# Patient Record
Sex: FEMALE | Race: WHITE | NOT HISPANIC OR LATINO | Employment: OTHER | URBAN - METROPOLITAN AREA
[De-identification: names, ages, dates, MRNs, and addresses within clinical notes are randomized per-mention and may not be internally consistent; named-entity substitution may affect disease eponyms.]

---

## 2017-06-23 ENCOUNTER — CONSULT (OUTPATIENT)
Dept: CARDIOLOGY | Facility: CLINIC | Age: 72
End: 2017-06-23

## 2017-06-23 VITALS
HEART RATE: 59 BPM | DIASTOLIC BLOOD PRESSURE: 80 MMHG | WEIGHT: 168.8 LBS | SYSTOLIC BLOOD PRESSURE: 118 MMHG | BODY MASS INDEX: 28.82 KG/M2 | HEIGHT: 64 IN

## 2017-06-23 DIAGNOSIS — I10 ESSENTIAL HYPERTENSION: ICD-10-CM

## 2017-06-23 DIAGNOSIS — R06.02 SHORTNESS OF BREATH: ICD-10-CM

## 2017-06-23 DIAGNOSIS — R07.89 CHEST TIGHTNESS: Primary | ICD-10-CM

## 2017-06-23 DIAGNOSIS — E78.2 MIXED HYPERLIPIDEMIA: ICD-10-CM

## 2017-06-23 PROCEDURE — 99204 OFFICE O/P NEW MOD 45 MIN: CPT | Performed by: INTERNAL MEDICINE

## 2017-06-23 PROCEDURE — 93000 ELECTROCARDIOGRAM COMPLETE: CPT | Performed by: INTERNAL MEDICINE

## 2017-06-23 RX ORDER — LEVOTHYROXINE SODIUM 88 UG/1
88 TABLET ORAL DAILY
COMMUNITY
Start: 2017-04-20

## 2017-06-23 RX ORDER — ATORVASTATIN CALCIUM 20 MG/1
20 TABLET, FILM COATED ORAL DAILY
Status: ON HOLD | COMMUNITY
Start: 2017-04-20 | End: 2017-07-05

## 2017-06-23 RX ORDER — VALSARTAN 80 MG/1
80 TABLET ORAL DAILY
COMMUNITY
Start: 2017-04-20 | End: 2020-10-01 | Stop reason: ALTCHOICE

## 2017-06-23 RX ORDER — NAPROXEN 500 MG/1
500 TABLET ORAL DAILY PRN
COMMUNITY
End: 2020-10-02

## 2017-06-23 RX ORDER — ATENOLOL 50 MG/1
25 TABLET ORAL 2 TIMES DAILY
Status: ON HOLD | COMMUNITY
Start: 2017-04-20 | End: 2017-07-05

## 2017-06-23 RX ORDER — ASPIRIN 81 MG/1
162 TABLET ORAL DAILY
COMMUNITY

## 2017-06-23 NOTE — PROGRESS NOTES
Encounter Date:06/23/2017    Location: [place]    Jl Caal MD    Patient ID: Reshma Renee is a 72 y.o. female resident of [place], [place].    1945  Subjective:      Chief Complaint/Reason for visit:    Chief Complaint   Patient presents with   • Shortness of Breath   • Dizziness   • Chest Pain       Problem List:  1. Chest Pain/Tightness/Angina  2. Hypertension  3. Hyperlipidemia  4. Osteoarthritis  5. History of Tobacco Abuse with cessation 1989  6. Hypothyroid on Chronic Replacement Therapy  7. History of Migraine Headaches  8. CT angio of neck revealing 50-69 % proximal CRYSTAL stenosis  9. History of Colitis      HPI: Mrs. Renee is a 72 yr old white female with the above noted medical history who presents in consultation referred by Dr. Langston for further evaluation of her chest pain. Mrs. Renee states that on May 9th she developed chest tightness at rest with associated bilateral arm weakness, heart pounding and mild shortness of breath.  The symptoms lasted for 30 minutes. She had a similar episode last Wednesday that was less severe. She presented to the [place] ED and ruled out for Myocardial Infarction.  She states that she just doesn't feel well.  She denies chest tightness with exertional activities.  She does note dyspnea with exertion.  She has frequent almost daily episodes of being lightheaded. She denies edema, palpitations, orthopnea, PND or syncope.       Social History     Social History   • Marital status:      Spouse name: N/A   • Number of children: N/A   • Years of education: N/A     Occupational History   • Not on file.     Social History Main Topics   • Smoking status: Former Smoker     Quit date: 11/9/1989   • Smokeless tobacco: Not on file   • Alcohol use No   • Drug use: No   • Sexual activity: Defer     Other Topics Concern   • Not on file     Social History Narrative   • No narrative on file       family history includes Asthma in her mother; Heart failure  "in her mother; Parkinsonism in her sister; Suicidality in her father.     has a past medical history of Chest tightness (6/23/2017); Essential hypertension (6/23/2017); Hyperlipidemia; Hypertension; Mixed hyperlipidemia (6/23/2017); and Shortness of breath (6/23/2017).    No Known Allergies      Current Outpatient Prescriptions:   •  aspirin 81 MG EC tablet, Take 162 mg by mouth Daily., Disp: , Rfl:   •  atenolol (TENORMIN) 50 MG tablet, Take 50 mg by mouth Daily., Disp: , Rfl:   •  atorvastatin (LIPITOR) 20 MG tablet, Take 20 mg by mouth Daily., Disp: , Rfl:   •  levothyroxine (SYNTHROID, LEVOTHROID) 100 MCG tablet, Take 100 mcg by mouth Daily., Disp: , Rfl:   •  naproxen (NAPROSYN) 500 MG tablet, Take 500 mg by mouth 2 (Two) Times a Day With Meals., Disp: , Rfl:   •  valsartan (DIOVAN) 80 MG tablet, Take 80 mg by mouth Daily., Disp: , Rfl:     Review of Systems   Constitution: Positive for malaise/fatigue.   HENT: Negative.    Cardiovascular: Positive for chest pain and dyspnea on exertion. Negative for irregular heartbeat, leg swelling, near-syncope, orthopnea, palpitations, paroxysmal nocturnal dyspnea and syncope.   Respiratory: Positive for shortness of breath.    Endocrine: Negative.    Hematologic/Lymphatic: Negative.    Skin: Negative.    Musculoskeletal: Positive for arthritis, joint pain and muscle weakness.   Gastrointestinal: Positive for abdominal pain and heartburn.   Genitourinary: Negative.    Neurological: Positive for disturbances in coordination and light-headedness.   Psychiatric/Behavioral: Positive for memory loss.   Allergic/Immunologic: Negative.        Vitals:    06/23/17 1308   BP: 118/80   BP Location: Right arm   Patient Position: Sitting   Pulse: 59   Weight: 168 lb 12.8 oz (76.6 kg)   Height: 64\" (162.6 cm)         Objective:       Physical Exam   Constitutional: She is oriented to person, place, and time. She appears well-developed and well-nourished.   HENT:   Head: Normocephalic and " atraumatic.   Neck: Normal range of motion. No JVD present. No thyromegaly present.   Cardiovascular: Normal rate, regular rhythm, normal heart sounds and intact distal pulses.  Exam reveals no gallop and no friction rub.    No murmur heard.  Pulmonary/Chest: Effort normal and breath sounds normal. No respiratory distress. She has no wheezes. She has no rales.   Abdominal: Soft. Bowel sounds are normal. She exhibits no distension. There is no tenderness.   Musculoskeletal: Normal range of motion. She exhibits no edema.   Neurological: She is alert and oriented to person, place, and time.   Skin: Skin is warm and dry.   Psychiatric: She has a normal mood and affect. Her behavior is normal. Judgment and thought content normal.   Nursing note and vitals reviewed.      Data Review:     ECG 12 Lead  Date/Time: 6/23/2017 1:32 PM  Performed by: LYDIA DONIS  Authorized by: LYDIA DONIS   Comparison: compared with previous ECG from 5/10/2017  Similar to previous ECG  Rhythm: sinus bradycardia  Rate: bradycardic  BPM: 51  Conduction: conduction normal  ST Segments: ST segments normal  T Waves: T waves normal  QRS axis: normal  Other: no other findings  Clinical impression: normal ECG             Assessment:      Diagnosis Plan   1. Chest tightness  ECG 12 Lead    Case Request Cath Lab: Left Heart Cath   2. Shortness of breath  Case Request Cath Lab: Left Heart Cath   3. Essential hypertension  Well- controlled   4. Mixed hyperlipidemia  Check Lipid profile t time of cath     Plan:   1.  Cardiac catheterization next  Week  2.  Change Atenolol to 25 mg po bid  3.  Check TSH and Lipid profile at the time of cath.  4.  Further recommendations following cath.    Thank you for allowing us to participate in the care of your patient.     Scribed for Lydia Donis MD by Altagracia Fierro. 6/23/2017  1:57 PM    Lydia KWAN MD, personally performed the services described in this documentation as scribed by the above named  individual in my presence, and it is both accurate and complete.  6/23/2017  3:29 PM      Please note that portions of this note may have been completed with a voice recognition program. Efforts were made to edit the dictations, but occasionally words are mistranscribed.

## 2017-06-26 RX ORDER — NITROGLYCERIN 0.4 MG/1
TABLET SUBLINGUAL
Qty: 15 TABLET | Refills: 0 | Status: SHIPPED | OUTPATIENT
Start: 2017-06-26 | End: 2018-06-15 | Stop reason: SDUPTHER

## 2017-06-29 ENCOUNTER — PREP FOR SURGERY (OUTPATIENT)
Dept: OTHER | Facility: HOSPITAL | Age: 72
End: 2017-06-29

## 2017-06-29 DIAGNOSIS — R07.2 PRECORDIAL PAIN: Primary | ICD-10-CM

## 2017-06-29 RX ORDER — SODIUM CHLORIDE 0.9 % (FLUSH) 0.9 %
1-10 SYRINGE (ML) INJECTION AS NEEDED
Status: CANCELLED | OUTPATIENT
Start: 2017-06-29

## 2017-06-29 RX ORDER — NITROGLYCERIN 0.4 MG/1
0.4 TABLET SUBLINGUAL
Status: CANCELLED | OUTPATIENT
Start: 2017-06-29

## 2017-06-29 RX ORDER — ASPIRIN 81 MG/1
81 TABLET ORAL DAILY
Status: CANCELLED | OUTPATIENT
Start: 2017-06-30

## 2017-06-29 RX ORDER — ACETAMINOPHEN 325 MG/1
650 TABLET ORAL EVERY 4 HOURS PRN
Status: CANCELLED | OUTPATIENT
Start: 2017-06-29

## 2017-06-29 RX ORDER — ASPIRIN 81 MG/1
324 TABLET, CHEWABLE ORAL ONCE
Status: CANCELLED | OUTPATIENT
Start: 2017-06-29 | End: 2017-06-29

## 2017-07-05 ENCOUNTER — APPOINTMENT (OUTPATIENT)
Dept: GENERAL RADIOLOGY | Facility: HOSPITAL | Age: 72
End: 2017-07-05

## 2017-07-05 ENCOUNTER — HOSPITAL ENCOUNTER (OUTPATIENT)
Facility: HOSPITAL | Age: 72
Setting detail: HOSPITAL OUTPATIENT SURGERY
Discharge: HOME | End: 2017-07-05
Attending: INTERNAL MEDICINE | Admitting: INTERNAL MEDICINE

## 2017-07-05 VITALS
RESPIRATION RATE: 16 BRPM | WEIGHT: 167.55 LBS | BODY MASS INDEX: 28.6 KG/M2 | TEMPERATURE: 97.5 F | SYSTOLIC BLOOD PRESSURE: 124 MMHG | OXYGEN SATURATION: 99 % | HEIGHT: 64 IN | HEART RATE: 47 BPM | DIASTOLIC BLOOD PRESSURE: 90 MMHG

## 2017-07-05 DIAGNOSIS — I77.9 RIGHT-SIDED CAROTID ARTERY DISEASE (HCC): ICD-10-CM

## 2017-07-05 DIAGNOSIS — R07.89 CHEST TIGHTNESS: ICD-10-CM

## 2017-07-05 DIAGNOSIS — E78.5 HYPERLIPIDEMIA LDL GOAL <70: ICD-10-CM

## 2017-07-05 DIAGNOSIS — I10 ESSENTIAL HYPERTENSION: ICD-10-CM

## 2017-07-05 DIAGNOSIS — R07.2 PRECORDIAL PAIN: ICD-10-CM

## 2017-07-05 DIAGNOSIS — R06.02 SHORTNESS OF BREATH: ICD-10-CM

## 2017-07-05 DIAGNOSIS — I25.119 CORONARY ARTERY DISEASE INVOLVING NATIVE CORONARY ARTERY OF NATIVE HEART WITH ANGINA PECTORIS (HCC): Primary | ICD-10-CM

## 2017-07-05 PROBLEM — R00.1 DRUG-INDUCED BRADYCARDIA: Status: ACTIVE | Noted: 2017-07-05

## 2017-07-05 PROBLEM — I20.0 UNSTABLE ANGINA (HCC): Status: ACTIVE | Noted: 2017-06-23

## 2017-07-05 PROBLEM — T50.905A DRUG-INDUCED BRADYCARDIA: Status: ACTIVE | Noted: 2017-07-05

## 2017-07-05 PROBLEM — E03.9 HYPOTHYROIDISM: Status: ACTIVE | Noted: 2017-07-05

## 2017-07-05 PROBLEM — R00.2 PALPITATIONS: Status: ACTIVE | Noted: 2017-07-05

## 2017-07-05 LAB
ALBUMIN SERPL-MCNC: 4 G/DL (ref 3.2–4.8)
ALBUMIN/GLOB SERPL: 1.5 G/DL (ref 1.5–2.5)
ALP SERPL-CCNC: 68 U/L (ref 25–100)
ALT SERPL W P-5'-P-CCNC: 17 U/L (ref 7–40)
ANION GAP SERPL CALCULATED.3IONS-SCNC: 8 MMOL/L (ref 3–11)
ARTICHOKE IGE QN: 81 MG/DL (ref 0–130)
AST SERPL-CCNC: 23 U/L (ref 0–33)
BASOPHILS # BLD AUTO: 0.04 10*3/MM3 (ref 0–0.2)
BASOPHILS NFR BLD AUTO: 0.8 % (ref 0–1)
BILIRUB SERPL-MCNC: 0.6 MG/DL (ref 0.3–1.2)
BUN BLD-MCNC: 13 MG/DL (ref 9–23)
BUN/CREAT SERPL: 11.8 (ref 7–25)
CALCIUM SPEC-SCNC: 10.4 MG/DL (ref 8.7–10.4)
CHLORIDE SERPL-SCNC: 107 MMOL/L (ref 99–109)
CHOLEST SERPL-MCNC: 137 MG/DL (ref 0–200)
CO2 SERPL-SCNC: 26 MMOL/L (ref 20–31)
CREAT BLD-MCNC: 1.1 MG/DL (ref 0.6–1.3)
DEPRECATED RDW RBC AUTO: 43.5 FL (ref 37–54)
EOSINOPHIL # BLD AUTO: 0.13 10*3/MM3 (ref 0–0.3)
EOSINOPHIL NFR BLD AUTO: 2.5 % (ref 0–3)
ERYTHROCYTE [DISTWIDTH] IN BLOOD BY AUTOMATED COUNT: 12.6 % (ref 11.3–14.5)
GFR SERPL CREATININE-BSD FRML MDRD: 49 ML/MIN/1.73
GLOBULIN UR ELPH-MCNC: 2.7 GM/DL
GLUCOSE BLD-MCNC: 124 MG/DL (ref 70–100)
HBA1C MFR BLD: 6.5 % (ref 4.8–5.6)
HCT VFR BLD AUTO: 38.1 % (ref 34.5–44)
HDLC SERPL-MCNC: 40 MG/DL (ref 40–60)
HGB BLD-MCNC: 12.3 G/DL (ref 11.5–15.5)
IMM GRANULOCYTES # BLD: 0 10*3/MM3 (ref 0–0.03)
IMM GRANULOCYTES NFR BLD: 0 % (ref 0–0.6)
LYMPHOCYTES # BLD AUTO: 1.88 10*3/MM3 (ref 0.6–4.8)
LYMPHOCYTES NFR BLD AUTO: 36.4 % (ref 24–44)
MCH RBC QN AUTO: 30.6 PG (ref 27–31)
MCHC RBC AUTO-ENTMCNC: 32.3 G/DL (ref 32–36)
MCV RBC AUTO: 94.8 FL (ref 80–99)
MONOCYTES # BLD AUTO: 0.63 10*3/MM3 (ref 0–1)
MONOCYTES NFR BLD AUTO: 12.2 % (ref 0–12)
NEUTROPHILS # BLD AUTO: 2.49 10*3/MM3 (ref 1.5–8.3)
NEUTROPHILS NFR BLD AUTO: 48.1 % (ref 41–71)
PLATELET # BLD AUTO: 221 10*3/MM3 (ref 150–450)
PMV BLD AUTO: 10.4 FL (ref 6–12)
POTASSIUM BLD-SCNC: 3.8 MMOL/L (ref 3.5–5.5)
PROT SERPL-MCNC: 6.7 G/DL (ref 5.7–8.2)
RBC # BLD AUTO: 4.02 10*6/MM3 (ref 3.89–5.14)
SODIUM BLD-SCNC: 141 MMOL/L (ref 132–146)
TRIGL SERPL-MCNC: 96 MG/DL (ref 0–150)
WBC NRBC COR # BLD: 5.17 10*3/MM3 (ref 3.5–10.8)

## 2017-07-05 PROCEDURE — 80061 LIPID PANEL: CPT | Performed by: NURSE PRACTITIONER

## 2017-07-05 PROCEDURE — 93005 ELECTROCARDIOGRAM TRACING: CPT | Performed by: NURSE PRACTITIONER

## 2017-07-05 PROCEDURE — 71010 HC CHEST PA OR AP: CPT

## 2017-07-05 PROCEDURE — C1769 GUIDE WIRE: HCPCS | Performed by: INTERNAL MEDICINE

## 2017-07-05 PROCEDURE — 25010000002 HEPARIN (PORCINE) PER 1000 UNITS: Performed by: INTERNAL MEDICINE

## 2017-07-05 PROCEDURE — 25010000002 MIDAZOLAM PER 1 MG: Performed by: INTERNAL MEDICINE

## 2017-07-05 PROCEDURE — 0 IOPAMIDOL PER 1 ML: Performed by: INTERNAL MEDICINE

## 2017-07-05 PROCEDURE — 93458 L HRT ARTERY/VENTRICLE ANGIO: CPT | Performed by: INTERNAL MEDICINE

## 2017-07-05 PROCEDURE — 36415 COLL VENOUS BLD VENIPUNCTURE: CPT

## 2017-07-05 PROCEDURE — 83036 HEMOGLOBIN GLYCOSYLATED A1C: CPT | Performed by: NURSE PRACTITIONER

## 2017-07-05 PROCEDURE — 85025 COMPLETE CBC W/AUTO DIFF WBC: CPT | Performed by: NURSE PRACTITIONER

## 2017-07-05 PROCEDURE — 80053 COMPREHEN METABOLIC PANEL: CPT | Performed by: NURSE PRACTITIONER

## 2017-07-05 PROCEDURE — C1894 INTRO/SHEATH, NON-LASER: HCPCS | Performed by: INTERNAL MEDICINE

## 2017-07-05 PROCEDURE — 25010000002 FENTANYL CITRATE (PF) 100 MCG/2ML SOLUTION: Performed by: INTERNAL MEDICINE

## 2017-07-05 RX ORDER — ASPIRIN 81 MG/1
324 TABLET, CHEWABLE ORAL ONCE
Status: COMPLETED | OUTPATIENT
Start: 2017-07-05 | End: 2017-07-05

## 2017-07-05 RX ORDER — FENTANYL CITRATE 50 UG/ML
INJECTION, SOLUTION INTRAMUSCULAR; INTRAVENOUS AS NEEDED
Status: DISCONTINUED | OUTPATIENT
Start: 2017-07-05 | End: 2017-07-05 | Stop reason: HOSPADM

## 2017-07-05 RX ORDER — SODIUM CHLORIDE 9 MG/ML
1-3 INJECTION, SOLUTION INTRAVENOUS CONTINUOUS
Status: DISCONTINUED | OUTPATIENT
Start: 2017-07-05 | End: 2017-07-05 | Stop reason: HOSPADM

## 2017-07-05 RX ORDER — NITROGLYCERIN 0.4 MG/1
0.4 TABLET SUBLINGUAL
Status: DISCONTINUED | OUTPATIENT
Start: 2017-07-05 | End: 2017-07-05 | Stop reason: HOSPADM

## 2017-07-05 RX ORDER — MIDAZOLAM HYDROCHLORIDE 1 MG/ML
INJECTION INTRAMUSCULAR; INTRAVENOUS AS NEEDED
Status: DISCONTINUED | OUTPATIENT
Start: 2017-07-05 | End: 2017-07-05 | Stop reason: HOSPADM

## 2017-07-05 RX ORDER — ATENOLOL 50 MG/1
25 TABLET ORAL DAILY
Qty: 45 TABLET | Refills: 1 | Status: SHIPPED | OUTPATIENT
Start: 2017-07-05 | End: 2018-01-01

## 2017-07-05 RX ORDER — SODIUM CHLORIDE 0.9 % (FLUSH) 0.9 %
1-10 SYRINGE (ML) INJECTION AS NEEDED
Status: DISCONTINUED | OUTPATIENT
Start: 2017-07-05 | End: 2017-07-05 | Stop reason: HOSPADM

## 2017-07-05 RX ORDER — ACETAMINOPHEN 325 MG/1
650 TABLET ORAL EVERY 4 HOURS PRN
Status: DISCONTINUED | OUTPATIENT
Start: 2017-07-05 | End: 2017-07-05 | Stop reason: HOSPADM

## 2017-07-05 RX ORDER — LIDOCAINE HYDROCHLORIDE 10 MG/ML
INJECTION, SOLUTION INFILTRATION; PERINEURAL AS NEEDED
Status: DISCONTINUED | OUTPATIENT
Start: 2017-07-05 | End: 2017-07-05 | Stop reason: HOSPADM

## 2017-07-05 RX ORDER — ATORVASTATIN CALCIUM 40 MG/1
40 TABLET, FILM COATED ORAL NIGHTLY
Qty: 90 TABLET | Refills: 1 | Status: SHIPPED | OUTPATIENT
Start: 2017-07-05 | End: 2018-01-01

## 2017-07-05 RX ORDER — ASPIRIN 81 MG/1
81 TABLET ORAL DAILY
Status: DISCONTINUED | OUTPATIENT
Start: 2017-07-06 | End: 2017-07-05 | Stop reason: HOSPADM

## 2017-07-05 RX ORDER — HEPARIN SODIUM 1000 [USP'U]/ML
INJECTION, SOLUTION INTRAVENOUS; SUBCUTANEOUS AS NEEDED
Status: DISCONTINUED | OUTPATIENT
Start: 2017-07-05 | End: 2017-07-05 | Stop reason: HOSPADM

## 2017-07-05 RX ADMIN — ASPIRIN 81 MG 324 MG: 81 TABLET ORAL at 07:34

## 2017-07-05 RX ADMIN — SODIUM CHLORIDE 3 ML/KG/HR: 9 INJECTION, SOLUTION INTRAVENOUS at 07:34

## 2017-07-05 NOTE — INTERVAL H&P NOTE
H&P reviewed. The patient was examined and there are no changes to the H&P.     No changes to the physical exam  Lab Results   Component Value Date    BUN 13 07/05/2017     Lab Results   Component Value Date    CREATININE 1.10 07/05/2017     Lab Results   Component Value Date     07/05/2017    K 3.8 07/05/2017     07/05/2017    CO2 26.0 07/05/2017     Lab Results   Component Value Date    WBC 5.17 07/05/2017    HGB 12.3 07/05/2017    HCT 38.1 07/05/2017    MCV 94.8 07/05/2017     07/05/2017       Hospital Problem List     * (Principal)Unstable angina    Overview Signed 7/5/2017  8:24 AM by Raffi Bradford MD     · JOANA risk = 4         Essential hypertension    Hyperlipidemia LDL goal <70    Hypothyroidism    Palpitations          Plan:    · Cardiac cath via the left radial approach    Raffi Bradford MD  7/5/2017

## 2017-07-05 NOTE — PLAN OF CARE
Problem: Patient Care Overview (Adult)  Goal: Plan of Care Review  Outcome: Outcome(s) achieved Date Met:  07/05/17 07/05/17 1145   Coping/Psychosocial Response Interventions   Plan Of Care Reviewed With patient   Patient Care Overview   Progress no change   Outcome Evaluation   Outcome Summary/Follow up Plan Patient's site stable at time of discharge. Patient is being discharged home with family.        Goal: Adult Individualization and Mutuality  Outcome: Outcome(s) achieved Date Met:  07/05/17  Goal: Discharge Needs Assessment  Outcome: Outcome(s) achieved Date Met:  07/05/17    Problem: Cardiac Catheterization with/without PCI (Adult)  Goal: Signs and Symptoms of Listed Potential Problems Will be Absent or Manageable (Cardiac Catheterization with/without PCI)  Outcome: Outcome(s) achieved Date Met:  07/05/17

## 2017-07-05 NOTE — H&P (VIEW-ONLY)
Encounter Date:06/23/2017    Location: [place]    Jl Caal MD    Patient ID: Reshma Renee is a 72 y.o. female resident of [place], [place].    1945  Subjective:      Chief Complaint/Reason for visit:    Chief Complaint   Patient presents with   • Shortness of Breath   • Dizziness   • Chest Pain       Problem List:  1. Chest Pain/Tightness/Angina  2. Hypertension  3. Hyperlipidemia  4. Osteoarthritis  5. History of Tobacco Abuse with cessation 1989  6. Hypothyroid on Chronic Replacement Therapy  7. History of Migraine Headaches  8. CT angio of neck revealing 50-69 % proximal CRYSTAL stenosis  9. History of Colitis      HPI: Mrs. Renee is a 72 yr old white female with the above noted medical history who presents in consultation referred by Dr. Langston for further evaluation of her chest pain. Mrs. Renee states that on May 9th she developed chest tightness at rest with associated bilateral arm weakness, heart pounding and mild shortness of breath.  The symptoms lasted for 30 minutes. She had a similar episode last Wednesday that was less severe. She presented to the [place] ED and ruled out for Myocardial Infarction.  She states that she just doesn't feel well.  She denies chest tightness with exertional activities.  She does note dyspnea with exertion.  She has frequent almost daily episodes of being lightheaded. She denies edema, palpitations, orthopnea, PND or syncope.       Social History     Social History   • Marital status:      Spouse name: N/A   • Number of children: N/A   • Years of education: N/A     Occupational History   • Not on file.     Social History Main Topics   • Smoking status: Former Smoker     Quit date: 11/9/1989   • Smokeless tobacco: Not on file   • Alcohol use No   • Drug use: No   • Sexual activity: Defer     Other Topics Concern   • Not on file     Social History Narrative   • No narrative on file       family history includes Asthma in her mother; Heart failure  "in her mother; Parkinsonism in her sister; Suicidality in her father.     has a past medical history of Chest tightness (6/23/2017); Essential hypertension (6/23/2017); Hyperlipidemia; Hypertension; Mixed hyperlipidemia (6/23/2017); and Shortness of breath (6/23/2017).    No Known Allergies      Current Outpatient Prescriptions:   •  aspirin 81 MG EC tablet, Take 162 mg by mouth Daily., Disp: , Rfl:   •  atenolol (TENORMIN) 50 MG tablet, Take 50 mg by mouth Daily., Disp: , Rfl:   •  atorvastatin (LIPITOR) 20 MG tablet, Take 20 mg by mouth Daily., Disp: , Rfl:   •  levothyroxine (SYNTHROID, LEVOTHROID) 100 MCG tablet, Take 100 mcg by mouth Daily., Disp: , Rfl:   •  naproxen (NAPROSYN) 500 MG tablet, Take 500 mg by mouth 2 (Two) Times a Day With Meals., Disp: , Rfl:   •  valsartan (DIOVAN) 80 MG tablet, Take 80 mg by mouth Daily., Disp: , Rfl:     Review of Systems   Constitution: Positive for malaise/fatigue.   HENT: Negative.    Cardiovascular: Positive for chest pain and dyspnea on exertion. Negative for irregular heartbeat, leg swelling, near-syncope, orthopnea, palpitations, paroxysmal nocturnal dyspnea and syncope.   Respiratory: Positive for shortness of breath.    Endocrine: Negative.    Hematologic/Lymphatic: Negative.    Skin: Negative.    Musculoskeletal: Positive for arthritis, joint pain and muscle weakness.   Gastrointestinal: Positive for abdominal pain and heartburn.   Genitourinary: Negative.    Neurological: Positive for disturbances in coordination and light-headedness.   Psychiatric/Behavioral: Positive for memory loss.   Allergic/Immunologic: Negative.        Vitals:    06/23/17 1308   BP: 118/80   BP Location: Right arm   Patient Position: Sitting   Pulse: 59   Weight: 168 lb 12.8 oz (76.6 kg)   Height: 64\" (162.6 cm)         Objective:       Physical Exam   Constitutional: She is oriented to person, place, and time. She appears well-developed and well-nourished.   HENT:   Head: Normocephalic and " atraumatic.   Neck: Normal range of motion. No JVD present. No thyromegaly present.   Cardiovascular: Normal rate, regular rhythm, normal heart sounds and intact distal pulses.  Exam reveals no gallop and no friction rub.    No murmur heard.  Pulmonary/Chest: Effort normal and breath sounds normal. No respiratory distress. She has no wheezes. She has no rales.   Abdominal: Soft. Bowel sounds are normal. She exhibits no distension. There is no tenderness.   Musculoskeletal: Normal range of motion. She exhibits no edema.   Neurological: She is alert and oriented to person, place, and time.   Skin: Skin is warm and dry.   Psychiatric: She has a normal mood and affect. Her behavior is normal. Judgment and thought content normal.   Nursing note and vitals reviewed.      Data Review:     ECG 12 Lead  Date/Time: 6/23/2017 1:32 PM  Performed by: LYDIA DONIS  Authorized by: LYDIA DONIS   Comparison: compared with previous ECG from 5/10/2017  Similar to previous ECG  Rhythm: sinus bradycardia  Rate: bradycardic  BPM: 51  Conduction: conduction normal  ST Segments: ST segments normal  T Waves: T waves normal  QRS axis: normal  Other: no other findings  Clinical impression: normal ECG             Assessment:      Diagnosis Plan   1. Chest tightness  ECG 12 Lead    Case Request Cath Lab: Left Heart Cath   2. Shortness of breath  Case Request Cath Lab: Left Heart Cath   3. Essential hypertension  Well- controlled   4. Mixed hyperlipidemia  Check Lipid profile t time of cath     Plan:   1.  Cardiac catheterization next  Week  2.  Change Atenolol to 25 mg po bid  3.  Check TSH and Lipid profile at the time of cath.  4.  Further recommendations following cath.    Thank you for allowing us to participate in the care of your patient.     Scribed for Lydia Donis MD by Altagracia Fierro. 6/23/2017  1:57 PM    Lydia KWAN MD, personally performed the services described in this documentation as scribed by the above named  individual in my presence, and it is both accurate and complete.  6/23/2017  3:29 PM      Please note that portions of this note may have been completed with a voice recognition program. Efforts were made to edit the dictations, but occasionally words are mistranscribed.

## 2017-08-25 ENCOUNTER — OFFICE VISIT (OUTPATIENT)
Dept: CARDIOLOGY | Facility: CLINIC | Age: 72
End: 2017-08-25

## 2017-08-25 VITALS
WEIGHT: 168.2 LBS | HEART RATE: 58 BPM | SYSTOLIC BLOOD PRESSURE: 118 MMHG | DIASTOLIC BLOOD PRESSURE: 72 MMHG | BODY MASS INDEX: 28.71 KG/M2 | HEIGHT: 64 IN

## 2017-08-25 DIAGNOSIS — I65.21 STENOSIS OF RIGHT CAROTID ARTERY: Primary | ICD-10-CM

## 2017-08-25 PROCEDURE — 99214 OFFICE O/P EST MOD 30 MIN: CPT | Performed by: INTERNAL MEDICINE

## 2017-08-25 NOTE — PROGRESS NOTES
Encounter Date:06/23/2017    Location: [place]    Jl Caal MD    Patient ID: Reshma Renee is a 72 y.o. female resident of [place], [place].    1945  Subjective:      Chief Complaint/Reason for visit:    Chief Complaint   Patient presents with   • Hypertension   • Hyperlipidemia   • Coronary Artery Disease       Problem List:  1. Chest Pain  6/17 [facility] per HTR minimal plaque EF 60%  2. Hypertension  3. Hyperlipidemia  4. Osteoarthritis  5. History of Tobacco Abuse with cessation 1989  6. Hypothyroid on Chronic Replacement Therapy  7. History of Migraine Headaches  8. 2015 CT angio of neck revealing 50-60% proximal CRYSTAL stenosis- [place]  9. History of Colitis      HPI: Mrs. Renee is a 72 yr old white [place] w minimal plaque.  Decreased atenolol at that time w noted bradycardia roxy procedurally. Some bilateral arm pain she is scheduled to have wrist surgery with Dr. Tay this following week.  She has been compliant with her medications and states that she feels relatively well.    Social History     Social History   • Marital status:      Spouse name: N/A   • Number of children: N/A   • Years of education: N/A     Occupational History   • Not on file.     Social History Main Topics   • Smoking status: Former Smoker     Quit date: 11/9/1989   • Smokeless tobacco: Never Used   • Alcohol use No   • Drug use: No   • Sexual activity: Defer     Other Topics Concern   • Not on file     Social History Narrative       family history includes Asthma in her mother; Heart failure in her mother; Parkinsonism in her sister; Suicidality in her father.     has a past medical history of Chest tightness (6/23/2017); Essential hypertension (6/23/2017); Hyperlipidemia; Hypertension; Mixed hyperlipidemia (6/23/2017); and Shortness of breath (6/23/2017).    No Known Allergies      Current Outpatient Prescriptions:   •  aspirin 81 MG EC tablet, Take 162 mg by mouth Daily., Disp: , Rfl:   •   "atenolol (TENORMIN) 50 MG tablet, Take 0.5 tablets by mouth Daily for 180 days., Disp: 45 tablet, Rfl: 1  •  atorvastatin (LIPITOR) 40 MG tablet, Take 1 tablet by mouth Every Night for 180 days., Disp: 90 tablet, Rfl: 1  •  Calcium Carbonate-Vit D-Min (CALCIUM 1200 PO), Take 1 tablet by mouth Daily., Disp: , Rfl:   •  Cholecalciferol (VITAMIN D PO), Take 1 tablet by mouth Daily., Disp: , Rfl:   •  levothyroxine (SYNTHROID, LEVOTHROID) 100 MCG tablet, Take 100 mcg by mouth Daily., Disp: , Rfl:   •  naproxen (NAPROSYN) 500 MG tablet, Take 500 mg by mouth 2 (Two) Times a Day With Meals., Disp: , Rfl:   •  nitroglycerin (NITROSTAT) 0.4 MG SL tablet, 1 under the tongue as needed for angina, may repeat q5mins for up three doses, Disp: 15 tablet, Rfl: 0  •  valsartan (DIOVAN) 80 MG tablet, Take 80 mg by mouth Daily., Disp: , Rfl:     ROS    Vitals:    08/25/17 0855   BP: 118/72   BP Location: Left arm   Patient Position: Sitting   Pulse: 58   Weight: 168 lb 3.2 oz (76.3 kg)   Height: 64\" (162.6 cm)         Objective:       Physical Exam   Constitutional: She is oriented to person, place, and time. She appears well-developed and well-nourished.   HENT:   Head: Normocephalic and atraumatic.   Neck: Normal range of motion. No JVD present. No thyromegaly present.   Cardiovascular: Normal rate, regular rhythm, normal heart sounds and intact distal pulses.  Exam reveals no gallop and no friction rub.    No murmur heard.  Pulmonary/Chest: Effort normal and breath sounds normal. No respiratory distress. She has no wheezes. She has no rales.   Abdominal: Soft. Bowel sounds are normal. She exhibits no distension. There is no tenderness.   Musculoskeletal: Normal range of motion. She exhibits no edema.   Neurological: She is alert and oriented to person, place, and time.   Skin: Skin is warm and dry.   Psychiatric: She has a normal mood and affect. Her behavior is normal. Judgment and thought content normal.   Nursing note and vitals " reviewed.      Data Review:   Procedures     Assessment:      Diagnosis Plan   1. Chest tightness  ECG 12 Lead    Case Request Cath Lab: Left Heart Cath   2. Shortness of breath  Case Request Cath Lab: Left Heart Cath   3. Essential hypertension  Well- controlled   4. Mixed hyperlipidemia  Check Lipid profile t time of cath     Plan:   1.  Carotid stenosis asymptomatic will plan carotid duplex at follow-up visit in the spring    2.  CAD nonobstructive continue risk factor modification medical management    3.  Dyslipidemia with LDL 70 on atorvastatin recommended 20 mg daily and/or 40 every other day.  With concerns of statin effect she will have vitamin D and coenzyme Q10 level obtained with follow-up serologies per her primary care provider    4.  Hypertension controlled on current regimen

## 2018-06-06 NOTE — PROGRESS NOTES
[facility]  Office Progress Note  Reshma Renee  1945  [phone]      Visit Date: 06/15/18    PCP: Jl Caal MD  [address] [place] [address] [address]  [facility] [id number]    IDENTIFICATION: A 73 y.o. female resides in [place], [place]    Chief Complaint   Patient presents with   • Chest Pain   • Hypertension       PROBLEM LIST:   1. CP  1. 6/17 [facility] HTR: Minimal plaque, EF 60%  2. HTN  3. HLD  1. 7/5/17  TG 96 HDL 40 LDL 81  4. OA  5. DM  1. 1/5/17 A1c 6.5  6. History of tobacco abuse  1. Cessation 1989  7. Hypothyroidism  8. Migraines  9. Carotid stenosis  1. 2015 CT angiogram neck revealing 50-60% proximal R ICA stenosis (Fortamet)  2. 6/18 50-69% CRYSTAL  10. History of colitis    Allergies  No Known Allergies    Current Medications    Current Outpatient Prescriptions:   •  aspirin 81 MG EC tablet, Take 162 mg by mouth Daily., Disp: , Rfl:   •  atorvastatin (LIPITOR) 20 MG tablet, Take 20 mg by mouth Daily., Disp: , Rfl:   •  Calcium Carbonate-Vit D-Min (CALCIUM 1200 PO), Take 1 tablet by mouth Daily., Disp: , Rfl:   •  levothyroxine (SYNTHROID, LEVOTHROID) 100 MCG tablet, Take 100 mcg by mouth Daily., Disp: , Rfl:   •  naproxen (NAPROSYN) 500 MG tablet, Take 500 mg by mouth Daily As Needed., Disp: , Rfl:   •  nitroglycerin (NITROSTAT) 0.4 MG SL tablet, 1 under the tongue as needed for angina, may repeat q5mins for up three doses, Disp: 15 tablet, Rfl: 0  •  valsartan (DIOVAN) 80 MG tablet, Take 80 mg by mouth Daily., Disp: , Rfl:       History of Present Illness     Pt denies any chest pain, dyspnea, dyspnea on exertion, orthopnea, PND, palpitations, lower extremity edema, or claudication.  Mother passed since last visit, will plan to snow bird in [place] this winter.    ROS:  All systems have been reviewed and are negative with the exception of those mentioned in the HPI.    OBJECTIVE:  Vitals:    06/15/18 1445   BP: 98/54   BP Location: Right arm   Patient Position: Sitting  "  Pulse: 62   Weight: 71.7 kg (158 lb)   Height: 162.6 cm (64\")     Physical Exam   Constitutional: She appears well-developed and well-nourished.   Neck: Normal range of motion. Neck supple. No hepatojugular reflux and no JVD present. Carotid bruit is not present. No tracheal deviation present. No thyromegaly present.   Cardiovascular: Normal rate, regular rhythm, S1 normal, S2 normal, intact distal pulses and normal pulses.  PMI is not displaced.  Exam reveals no gallop, no distant heart sounds, no friction rub, no midsystolic click and no opening snap.    No murmur heard.  Pulses:       Radial pulses are 2+ on the right side, and 2+ on the left side.        Dorsalis pedis pulses are 2+ on the right side, and 2+ on the left side.        Posterior tibial pulses are 2+ on the right side, and 2+ on the left side.   Pulmonary/Chest: Effort normal and breath sounds normal. She has no wheezes. She has no rales.   Abdominal: Soft. Bowel sounds are normal. She exhibits no mass. There is no tenderness. There is no guarding.       Diagnostic Data:  Procedures      ASSESSMENT:   Diagnosis Plan   1. CVD (cardiovascular disease)     2. CVD (cerebrovascular disease)     3. Mixed hyperlipidemia     4. Essential hypertension         PLAN:  1.  Carotid stenosis asymptomatic will plan carotid duplex at follow-up visit in the spring     2.  CAD nonobstructive continue risk factor modification medical management     3.  Dyslipidemia with LDL 70 on atorvastatin recommended 20 mg daily      4.  Hypertension controlled on current regimen    Jl Caal MD, thank you for referring Ms. Renee for evaluation.  I have forwarded my electronically generated recommendations to you for review.  Please do not hesitate to call with any questions.    Scribed for Jl Donis MD by Ana Maria Bajwa PA-C. 6/15/2018  3:42 PM  I, Jl Donis MD, personally performed the services described in this documentation as scribed by the above " named individual in my presence, and it is both accurate and complete.  6/15/2018  3:42 PM    Jl Donis MD, FACC

## 2018-06-15 ENCOUNTER — OFFICE VISIT (OUTPATIENT)
Dept: CARDIOLOGY | Facility: CLINIC | Age: 73
End: 2018-06-15

## 2018-06-15 ENCOUNTER — HOSPITAL ENCOUNTER (OUTPATIENT)
Dept: CARDIOLOGY | Facility: HOSPITAL | Age: 73
Discharge: HOME | End: 2018-06-15
Attending: INTERNAL MEDICINE | Admitting: INTERNAL MEDICINE

## 2018-06-15 VITALS
BODY MASS INDEX: 26.98 KG/M2 | HEART RATE: 62 BPM | DIASTOLIC BLOOD PRESSURE: 54 MMHG | SYSTOLIC BLOOD PRESSURE: 98 MMHG | HEIGHT: 64 IN | WEIGHT: 158 LBS

## 2018-06-15 DIAGNOSIS — I25.10 CVD (CARDIOVASCULAR DISEASE): Primary | ICD-10-CM

## 2018-06-15 DIAGNOSIS — I65.21 STENOSIS OF RIGHT CAROTID ARTERY: ICD-10-CM

## 2018-06-15 DIAGNOSIS — E78.2 MIXED HYPERLIPIDEMIA: ICD-10-CM

## 2018-06-15 DIAGNOSIS — I10 ESSENTIAL HYPERTENSION: ICD-10-CM

## 2018-06-15 DIAGNOSIS — I67.9 CVD (CEREBROVASCULAR DISEASE): ICD-10-CM

## 2018-06-15 LAB
BH CV XLRA MEAS LEFT CCA RATIO VEL: 116 CM/SEC
BH CV XLRA MEAS LEFT DIST CCA EDV: 25.9 CM/SEC
BH CV XLRA MEAS LEFT DIST CCA PSV: 90.4 CM/SEC
BH CV XLRA MEAS LEFT DIST ICA EDV: 37.7 CM/SEC
BH CV XLRA MEAS LEFT DIST ICA PSV: 101.4 CM/SEC
BH CV XLRA MEAS LEFT ICA RATIO VEL: 86.4 CM/SEC
BH CV XLRA MEAS LEFT ICA/CCA RATIO: 0.74
BH CV XLRA MEAS LEFT MID CCA EDV: 26.7 CM/SEC
BH CV XLRA MEAS LEFT MID CCA PSV: 117.1 CM/SEC
BH CV XLRA MEAS LEFT MID ICA EDV: 23.6 CM/SEC
BH CV XLRA MEAS LEFT MID ICA PSV: 87.2 CM/SEC
BH CV XLRA MEAS LEFT PROX CCA EDV: 30.3 CM/SEC
BH CV XLRA MEAS LEFT PROX CCA PSV: 131.3 CM/SEC
BH CV XLRA MEAS LEFT PROX ECA PSV: 106.9 CM/SEC
BH CV XLRA MEAS LEFT PROX ICA EDV: 21.2 CM/SEC
BH CV XLRA MEAS LEFT PROX ICA PSV: 87.2 CM/SEC
BH CV XLRA MEAS LEFT PROX SCLA PSV: 203.2 CM/SEC
BH CV XLRA MEAS LEFT VERTEBRAL A PSV: 77 CM/SEC
BH CV XLRA MEAS RIGHT CCA RATIO VEL: 101 CM/SEC
BH CV XLRA MEAS RIGHT DIST CCA EDV: 23 CM/SEC
BH CV XLRA MEAS RIGHT DIST CCA PSV: 95 CM/SEC
BH CV XLRA MEAS RIGHT DIST ICA EDV: 22.1 CM/SEC
BH CV XLRA MEAS RIGHT DIST ICA PSV: 72.7 CM/SEC
BH CV XLRA MEAS RIGHT ICA RATIO VEL: 163 CM/SEC
BH CV XLRA MEAS RIGHT ICA/CCA RATIO: 1.6
BH CV XLRA MEAS RIGHT MID CCA EDV: 24.4 CM/SEC
BH CV XLRA MEAS RIGHT MID CCA PSV: 102 CM/SEC
BH CV XLRA MEAS RIGHT MID ICA EDV: 51.1 CM/SEC
BH CV XLRA MEAS RIGHT MID ICA PSV: 152.2 CM/SEC
BH CV XLRA MEAS RIGHT PROX CCA EDV: 32.6 CM/SEC
BH CV XLRA MEAS RIGHT PROX CCA PSV: 115.6 CM/SEC
BH CV XLRA MEAS RIGHT PROX ECA PSV: 120.1 CM/SEC
BH CV XLRA MEAS RIGHT PROX ICA EDV: 64.8 CM/SEC
BH CV XLRA MEAS RIGHT PROX ICA PSV: 164 CM/SEC
BH CV XLRA MEAS RIGHT PROX SCLA PSV: 208.2 CM/SEC
BH CV XLRA MEAS RIGHT VERTEBRAL A PSV: 66.8 CM/SEC
LEFT ARM BP: NORMAL MMHG
RIGHT ARM BP: NORMAL MMHG

## 2018-06-15 PROCEDURE — 99213 OFFICE O/P EST LOW 20 MIN: CPT | Performed by: INTERNAL MEDICINE

## 2018-06-15 PROCEDURE — 93880 EXTRACRANIAL BILAT STUDY: CPT

## 2018-06-15 PROCEDURE — 93880 EXTRACRANIAL BILAT STUDY: CPT | Performed by: INTERNAL MEDICINE

## 2018-06-15 RX ORDER — ATORVASTATIN CALCIUM 80 MG/1
80 TABLET, FILM COATED ORAL DAILY
COMMUNITY

## 2018-06-15 RX ORDER — NITROGLYCERIN 0.4 MG/1
TABLET SUBLINGUAL
Qty: 15 TABLET | Refills: 0 | Status: SHIPPED | OUTPATIENT
Start: 2018-06-15 | End: 2020-10-02

## 2020-10-01 RX ORDER — FAMOTIDINE 20 MG/1
20 TABLET, FILM COATED ORAL DAILY
COMMUNITY
End: 2020-10-02

## 2020-10-01 RX ORDER — DOFETILIDE 0.25 MG/1
250 CAPSULE ORAL 2 TIMES DAILY
COMMUNITY

## 2020-10-01 NOTE — PROGRESS NOTES
Electrophysiology Clinic Consult     Reshma Renee  1945      10/02/20    DATE OF ADMISSION: (Not on file)  [facility] CARDIOLOGY    Jl Caal MD  [address] [place] [address] [address] / [facility] [id number]    Chief Complaint   Patient presents with   • Atrial Fibrillation       Problem List:  1. Paroxysmal Atrial Fibrillation   a. CHADSVasc = 6  b. Loop Recorder (MDT) 2017 implanted due to CVA  c. Transesophageal echocardiogram 10/17/2018 LVEF normal, no significant valvular insufficiency.  d. 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 Chest Pain Syndrome  a. Left heart catheterization June 2017 minimal plaque EF 60%  3. Hypertension  4. Dyslipidemia  5. Diabetes mellitus type 2  6. History of cerebellar CVA 2017  a. TIA in November 2018 while on Xarelto  7. History of tobacco abuse-resolved 1989  8. Hypothyroidism  9. History of migraines  10. Carotid stenosis  a. 2015 CT angiogram neck revealing 50 to 60% proximal right ICA stenosis  b. 6/2018 50 to 69% R ICA by ultrasound  11. History of colitis    History of Present Illness:     Ms. Renee is a pleasant 75-year-old white female with above-stated past medical history, who presents today in consultation for atrial fibrillation, referred by Dr. Steve Granado.  She has been following with Dr. Schoen at [facility] for the past few years.  He has a [employer] loop recorder which was implanted in 2017 after she had a CVA.  She apparently has failed sotalol.  She is now on Tikosyn and and does not want to take this for the rest of her life.  She is interested in ablation.  Most recent loop recorder interrogation in Dr. Granado's office showed episodes of paroxysmal atrial fibrillation less than 1%, with longest episode of 10 minutes.  Her symptoms in atrial fibrillation include tachypalpitations, chest tightness, and fatigue.  Her episodes of atrial fibrillation while on Tikosyn are very short-lived lasting minutes to seconds in duration.   The tachypalpitations are felt midsternal.  She feels that her overall symptomatology from her A. fib is mild.  She does not know any triggering or  relieving factors.  When she is in sinus rhythm she has done overall well.  She has had no recurrent TIA or CVA strokes on her current medical regimen.  There is no near syncope or syncope.  She has been compliant with her diet.  Her blood pressure has been well controlled at home    No Known Allergies     Cannot display prior to admission medications because the patient has not been admitted in this contact.            Current Outpatient Medications:   •  aspirin 81 MG EC tablet, Take 162 mg by mouth Daily., Disp: , Rfl:   •  atorvastatin (LIPITOR) 80 MG tablet, Take 80 mg by mouth Daily., Disp: , Rfl:   •  Calcium Carbonate-Vit D-Min (CALCIUM 1200 PO), Take 1 tablet by mouth Daily., Disp: , Rfl:   •  coenzyme Q10 100 MG capsule, Take 200 mg by mouth Daily., Disp: , Rfl:   •  dofetilide (TIKOSYN) 250 MCG capsule, Take 250 mcg by mouth 2 (Two) Times a Day., Disp: , Rfl:   •  levothyroxine (SYNTHROID, LEVOTHROID) 88 MCG tablet, Take 88 mcg by mouth Daily., Disp: , Rfl:   •  metoprolol tartrate (LOPRESSOR) 25 MG tablet, Take 25 mg by mouth 2 (Two) Times a Day., Disp: , Rfl:   •  rivaroxaban (XARELTO) 20 MG tablet, Take 20 mg by mouth Daily., Disp: , Rfl:     Social History     Socioeconomic History   • Marital status:      Spouse name: Not on file   • Number of children: Not on file   • Years of education: Not on file   • Highest education level: Not on file   Tobacco Use   • Smoking status: Former Smoker     Quit date: 1989     Years since quittin.9   • Smokeless tobacco: Never Used   Substance and Sexual Activity   • Alcohol use: No   • Drug use: No   • Sexual activity: Defer       Family History   Problem Relation Age of Onset   • Asthma Mother    • Heart failure Mother    • Suicidality Father    • Parkinsonism Sister        REVIEW OF SYSTEMS:   CONST:  " No weight loss, fever, chills, weakness + fatigue.   HEENT:  No visual loss, blurred vision, double vision, yellow sclerae.                   No hearing loss, congestion, sore throat.   SKIN:      No rashes, urticaria, ulcers, sores.     RESP:     No shortness of breath, hemoptysis, cough, sputum.   GI:           No anorexia, nausea, vomiting, + diarrhea. + abdominal pain, melena.   :         No burning on urination, hematuria or increased frequency.  ENDO:    No diaphoresis, cold or heat intolerance. No polyuria or polydipsia.   NEURO:  No headache, dizziness, syncope, paralysis, ataxia, or parasthesias.                  No change in bowel or bladder control. + history of CVA/TIA  MUSC:    No muscle, back pain, + joint pain +  stiffness.   HEME:    No anemia, bleeding, bruising. No history of DVT/PE.  PSYCH:  No history of depression, anxiety    Vitals:    10/02/20 1059   BP: 120/72   BP Location: Left arm   Patient Position: Sitting   Pulse: 56   Weight: 67.1 kg (148 lb)   Height: 162.6 cm (64\")                 Physical Exam:  GEN: Well nourished, well-developed, no acute distress  HEENT: Normocephalic, atraumatic, PERRLA, moist mucous membranes  NECK: Supple, NO JVD, no thyromegaly, no lymphadenopathy  CARD: S1S2, RRR normal S1 and S2.  There is an S4.  No murmurs are appreciated.  PMI is normal.  LUNGS: Clear to auscultation, normal respiratory effort  ABDOMEN: Soft, nontender, normal bowel sounds  EXTREMITIES: No gross deformities, no clubbing, cyanosis, or edema  SKIN: Warm, dry, no lesions.  NEURO: No focal deficits, alert and oriented x 3  PSYCHIATRIC: Normal affect and mood      I personally viewed and interpreted the patient's EKG/Telemetry/lab data    Lab Results   Component Value Date    GLUCOSE 124 (H) 07/05/2017    CALCIUM 10.4 07/05/2017     07/05/2017    K 3.8 07/05/2017    CO2 26.0 07/05/2017     07/05/2017    BUN 13 07/05/2017    CREATININE 1.10 07/05/2017    EGFRIFNONA 49 (L) " 07/05/2017    BCR 11.8 07/05/2017    ANIONGAP 8.0 07/05/2017     Lab Results   Component Value Date    WBC 5.17 07/05/2017    HGB 12.3 07/05/2017    HCT 38.1 07/05/2017    MCV 94.8 07/05/2017     07/05/2017     No results found for: INR, PROTIME  No results found for: TSH, L1XPDMP, Z8PMTPG, THYROIDAB    Interrogation of implantable loop recorder today demonstrates 2 episodes of atrial fibrillation approximately 2 to 4 minutes in duration.  No substantial bradycardia.      ECG 12 Lead    Date/Time: 10/2/2020 11:44 AM  Performed by: Marc Ricardo MD  Authorized by: Marc Ricardo MD   Comparison: compared with previous ECG from 7/5/2017  Similar to previous ECG  Rhythm: sinus bradycardia  BPM: 56                ICD-10-CM ICD-9-CM   1. PAF (paroxysmal atrial fibrillation) (CMS/[facility])  I48.0 427.31   2. Cerebrovascular accident (CVA), unspecified mechanism (CMS/[facility])  I63.9 434.91   3. Stenosis of right carotid artery  I65.21 433.10   4. Essential hypertension  I10 401.9       Assessment and Plan:     1.  Paroxysmal atrial fibrillation: Interrogation of her implantable loop recorder demonstrates short-lived episodes of atrial fibrillation associated with mild symptoms per the patient.  I had a long discussion with the patient today which include persistent antiarrhythmic therapy versus pulmonary vein ablation.  The patient is interested in pulmonary vein ablation but does not want to pursue this until potentially next year after further assessment of how she does.  In the interim she has done well from Tikosyn and would like to continue taking this medication as she is presently doing.    2.  CVA: History of TIA and CVA most likely embolic in nature although the patient does have carotid artery stenosis being followed by her neurologist.  She does have an elevated Jamie Vasc score of 6.  Continue both Xarelto and aspirin for now.  Would be a good OPTION candidate if she does pursue pulmonary vein ablation in  the future with possible left atrial appendage occlusive device insertion.    3.  Hypertension: Presently well controlled    Thank u for  the interesting consult.  Will continue to follow in regards to A. fib management.    Return  to clinic to see us in June 2021

## 2020-10-02 ENCOUNTER — CONSULT (OUTPATIENT)
Dept: CARDIOLOGY | Facility: CLINIC | Age: 75
End: 2020-10-02

## 2020-10-02 VITALS
HEART RATE: 56 BPM | SYSTOLIC BLOOD PRESSURE: 120 MMHG | HEIGHT: 64 IN | BODY MASS INDEX: 25.27 KG/M2 | DIASTOLIC BLOOD PRESSURE: 72 MMHG | WEIGHT: 148 LBS

## 2020-10-02 DIAGNOSIS — I10 ESSENTIAL HYPERTENSION: ICD-10-CM

## 2020-10-02 DIAGNOSIS — I63.9 CEREBROVASCULAR ACCIDENT (CVA), UNSPECIFIED MECHANISM (HCC): ICD-10-CM

## 2020-10-02 DIAGNOSIS — I65.21 STENOSIS OF RIGHT CAROTID ARTERY: ICD-10-CM

## 2020-10-02 DIAGNOSIS — I48.0 PAF (PAROXYSMAL ATRIAL FIBRILLATION) (HCC): Primary | ICD-10-CM

## 2020-10-02 PROCEDURE — 99204 OFFICE O/P NEW MOD 45 MIN: CPT | Performed by: INTERNAL MEDICINE

## 2020-10-02 PROCEDURE — 93291 INTERROG DEV EVAL SCRMS IP: CPT | Performed by: INTERNAL MEDICINE

## 2020-10-02 PROCEDURE — 93000 ELECTROCARDIOGRAM COMPLETE: CPT | Performed by: INTERNAL MEDICINE

## 2020-10-02 RX ORDER — UBIDECARENONE 100 MG
200 CAPSULE ORAL DAILY
COMMUNITY

## 2021-02-25 PROCEDURE — 93298 REM INTERROG DEV EVAL SCRMS: CPT | Performed by: INTERNAL MEDICINE

## 2021-02-25 PROCEDURE — G2066 INTER DEVC REMOTE 30D: HCPCS | Performed by: INTERNAL MEDICINE

## 2021-03-28 PROCEDURE — G2066 INTER DEVC REMOTE 30D: HCPCS | Performed by: INTERNAL MEDICINE

## 2021-03-28 PROCEDURE — 93298 REM INTERROG DEV EVAL SCRMS: CPT | Performed by: INTERNAL MEDICINE

## 2021-04-28 PROCEDURE — G2066 INTER DEVC REMOTE 30D: HCPCS | Performed by: INTERNAL MEDICINE

## 2021-04-28 PROCEDURE — 93298 REM INTERROG DEV EVAL SCRMS: CPT | Performed by: INTERNAL MEDICINE

## 2021-05-29 PROCEDURE — G2066 INTER DEVC REMOTE 30D: HCPCS | Performed by: INTERNAL MEDICINE

## 2021-05-29 PROCEDURE — 93298 REM INTERROG DEV EVAL SCRMS: CPT | Performed by: INTERNAL MEDICINE

## 2021-06-04 ENCOUNTER — OFFICE VISIT (OUTPATIENT)
Dept: CARDIOLOGY | Facility: CLINIC | Age: 76
End: 2021-06-04

## 2021-06-04 VITALS
HEART RATE: 54 BPM | HEIGHT: 64 IN | WEIGHT: 149 LBS | DIASTOLIC BLOOD PRESSURE: 84 MMHG | SYSTOLIC BLOOD PRESSURE: 136 MMHG | BODY MASS INDEX: 25.44 KG/M2

## 2021-06-04 DIAGNOSIS — I10 ESSENTIAL HYPERTENSION: ICD-10-CM

## 2021-06-04 DIAGNOSIS — I48.0 PAF (PAROXYSMAL ATRIAL FIBRILLATION) (HCC): Primary | ICD-10-CM

## 2021-06-04 DIAGNOSIS — G45.9 TIA (TRANSIENT ISCHEMIC ATTACK): ICD-10-CM

## 2021-06-04 PROCEDURE — 99213 OFFICE O/P EST LOW 20 MIN: CPT | Performed by: INTERNAL MEDICINE

## 2021-06-04 PROCEDURE — 93291 INTERROG DEV EVAL SCRMS IP: CPT | Performed by: INTERNAL MEDICINE

## 2021-06-04 PROCEDURE — 93000 ELECTROCARDIOGRAM COMPLETE: CPT | Performed by: INTERNAL MEDICINE

## 2021-06-04 RX ORDER — METOPROLOL SUCCINATE 50 MG/1
50 TABLET, EXTENDED RELEASE ORAL DAILY
COMMUNITY

## 2021-06-04 NOTE — PROGRESS NOTES
Reshma Renee  1945  [phone]    06/04/2021    [facility] CARDIOLOGY     Referring Provider: No ref. provider found     Jl Caal MD  [address] [facility] [address] [place] [address]    Chief Complaint   Patient presents with   • Atrial Fibrillation       Problem List:     1. Paroxysmal Atrial Fibrillation   a. CHADSVasc = 6  b. Loop Recorder (MDT) 2017 implanted due to CVA  c. Transesophageal echocardiogram 10/17/2018 LVEF normal, no significant valvular insufficiency.  d. 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 Chest Pain Syndrome  a. Left heart catheterization June 2017 minimal plaque EF 60%  3. Hypertension  4. Dyslipidemia  5. Diabetes mellitus type 2  6. History of cerebellar CVA 2017  a. TIA in November 2018 while on Xarelto  7. History of tobacco abuse-resolved 1989  8. Hypothyroidism  9. History of migraines  10. Carotid stenosis  a. 2015 CT angiogram neck revealing 50 to 60% proximal right ICA stenosis  b. 6/2018 50 to 69% R ICA by ultrasound  11. History of colitis      Allergies  No Known Allergies    Current Medications    Current Outpatient Medications:   •  aspirin 81 MG EC tablet, Take 162 mg by mouth Daily., Disp: , Rfl:   •  atorvastatin (LIPITOR) 80 MG tablet, Take 80 mg by mouth Daily., Disp: , Rfl:   •  Calcium Carbonate-Vit D-Min (CALCIUM 1200 PO), Take 1 tablet by mouth Daily., Disp: , Rfl:   •  coenzyme Q10 100 MG capsule, Take 200 mg by mouth Daily., Disp: , Rfl:   •  dofetilide (TIKOSYN) 250 MCG capsule, Take 250 mcg by mouth 2 (Two) Times a Day., Disp: , Rfl:   •  levothyroxine (SYNTHROID, LEVOTHROID) 88 MCG tablet, Take 88 mcg by mouth Daily., Disp: , Rfl:   •  metoprolol succinate XL (TOPROL-XL) 50 MG 24 hr tablet, Take 50 mg by mouth Daily., Disp: , Rfl:   •  rivaroxaban (XARELTO) 20 MG tablet, Take 20 mg by mouth Daily., Disp: , Rfl:     History of Present Illness     Pt presents for follow up of AF/CVA/HTN. Since we last saw the pt, pt overall has  "done reasonably well.  She does note some tachypalpitations that are relatively short-lived headaches tend to occur early in the morning.  She denies any chest pain.  She does note mild chronic shortness of breath with exertional activity.  Her biggest issue is GI related and she is hoping to find a new GI doctor.  She has been compliant with all medications.   Denies any hospitalizations, ER visits, bleeding, or TIA/CVA symptoms. Overall feels ok.  Has not been recently checking her blood pressures at home.    ROS:  General:  + fatigue, No weight gain or loss  Cardiovascular:  Denies CP, PND, syncope, near syncope, edema or palpitations.  Pulmonary:  + mild HELMS, No cough, or wheezing      Vitals:    06/04/21 1254   BP: 136/84   BP Location: Left arm   Patient Position: Sitting   Pulse: 54   Weight: 67.6 kg (149 lb)   Height: 162.6 cm (64\")     Body mass index is 25.58 kg/m².  PE:  General: NAD  Neck: no JVD, no carotid bruits, no TM  Heart RRR, NL S1, S2, S4 present, no rubs, murmurs  Lungs: CTA, no wheezes, rhonchi, or rales  Abd: soft, non-tender, NL BS  Ext: No musculoskeletal deformities, no edema, cyanosis, or clubbing  Psych: normal mood and affect    Diagnostic Data:    Interrogation of implantable loop recorder today demonstrates 51 short-lived episodes of atrial fibrillation which makes 0.1% of the time for atrial fibrillation.  No substantial bradycardia.      ECG 12 Lead    Date/Time: 6/4/2021 1:04 PM  Performed by: Marc Ricardo MD  Authorized by: Marc Ricardo MD   Comparison: compared with previous ECG from 10/2/2020  Similar to previous ECG  Rhythm: sinus bradycardia  BPM: 54              1. PAF (paroxysmal atrial fibrillation) (CMS/HCC)    2. Essential hypertension    3. TIA (transient ischemic attack)          Plan:  1.  Paroxysmal atrial fibrillation: Interrogation of her implantable loop recorder demonstrates short-lived episodes of atrial fibrillation associated with mild symptoms per " the patient.    Overall however the patient has done well.  She is not interested in more aggressive therapies at this time.  Would like to wait on pursuing a pulmonary vein ablation.  Continue Tikosyn.     2.  CVA: History of TIA and CVA most likely embolic in nature although the patient does have carotid artery stenosis being followed by her neurologist.  She does have an elevated Jamie Vasc score of 6.  Continue both Xarelto and aspirin for now.      3.  Hypertension: Presently well controlled       F/up in 6 months

## 2021-06-29 PROCEDURE — 93298 REM INTERROG DEV EVAL SCRMS: CPT | Performed by: INTERNAL MEDICINE

## 2021-06-29 PROCEDURE — G2066 INTER DEVC REMOTE 30D: HCPCS | Performed by: INTERNAL MEDICINE

## 2021-07-30 PROCEDURE — 93298 REM INTERROG DEV EVAL SCRMS: CPT | Performed by: INTERNAL MEDICINE

## 2021-07-30 PROCEDURE — G2066 INTER DEVC REMOTE 30D: HCPCS | Performed by: INTERNAL MEDICINE

## 2021-09-30 PROCEDURE — 93298 REM INTERROG DEV EVAL SCRMS: CPT | Performed by: INTERNAL MEDICINE

## 2021-09-30 PROCEDURE — G2066 INTER DEVC REMOTE 30D: HCPCS | Performed by: INTERNAL MEDICINE

## 2021-10-31 PROCEDURE — 93298 REM INTERROG DEV EVAL SCRMS: CPT | Performed by: INTERNAL MEDICINE

## 2021-10-31 PROCEDURE — G2066 INTER DEVC REMOTE 30D: HCPCS | Performed by: INTERNAL MEDICINE

## 2021-11-30 ENCOUNTER — TELEPHONE (OUTPATIENT)
Dept: CARDIOLOGY | Facility: CLINIC | Age: 76
End: 2021-11-30

## 2021-11-30 NOTE — TELEPHONE ENCOUNTER
Pt called concerned about her carelink monitor not working. I had her unplug the monitor and attempt to send a manual transmission. She received and error code stating the reader was not charged. I instructed her to leave the reader in the monitor for 30 minutes and if it is still not charged she will have to call medtronic for trouble shooting instructions.She is going to sit with a friend in the hospital and will return my call tomorrow.

## 2021-12-01 PROCEDURE — 93298 REM INTERROG DEV EVAL SCRMS: CPT | Performed by: INTERNAL MEDICINE

## 2021-12-01 PROCEDURE — G2066 INTER DEVC REMOTE 30D: HCPCS | Performed by: INTERNAL MEDICINE

## 2022-01-01 PROCEDURE — G2066 INTER DEVC REMOTE 30D: HCPCS | Performed by: INTERNAL MEDICINE

## 2022-01-01 PROCEDURE — 93298 REM INTERROG DEV EVAL SCRMS: CPT | Performed by: INTERNAL MEDICINE

## 2022-02-01 PROCEDURE — G2066 INTER DEVC REMOTE 30D: HCPCS | Performed by: INTERNAL MEDICINE

## 2022-02-01 PROCEDURE — 93298 REM INTERROG DEV EVAL SCRMS: CPT | Performed by: INTERNAL MEDICINE

## 2022-03-04 PROCEDURE — 93298 REM INTERROG DEV EVAL SCRMS: CPT | Performed by: INTERNAL MEDICINE

## 2022-03-04 PROCEDURE — G2066 INTER DEVC REMOTE 30D: HCPCS | Performed by: INTERNAL MEDICINE

## 2022-05-06 ENCOUNTER — OFFICE VISIT (OUTPATIENT)
Dept: CARDIOLOGY | Facility: CLINIC | Age: 77
End: 2022-05-06

## 2022-05-06 VITALS
OXYGEN SATURATION: 98 % | WEIGHT: 141 LBS | DIASTOLIC BLOOD PRESSURE: 92 MMHG | HEART RATE: 61 BPM | BODY MASS INDEX: 24.07 KG/M2 | HEIGHT: 64 IN | SYSTOLIC BLOOD PRESSURE: 150 MMHG

## 2022-05-06 DIAGNOSIS — I48.0 PAF (PAROXYSMAL ATRIAL FIBRILLATION): Primary | ICD-10-CM

## 2022-05-06 DIAGNOSIS — G45.9 TIA (TRANSIENT ISCHEMIC ATTACK): ICD-10-CM

## 2022-05-06 DIAGNOSIS — I10 ESSENTIAL HYPERTENSION: ICD-10-CM

## 2022-05-06 PROCEDURE — 93291 INTERROG DEV EVAL SCRMS IP: CPT | Performed by: INTERNAL MEDICINE

## 2022-05-06 PROCEDURE — 93000 ELECTROCARDIOGRAM COMPLETE: CPT | Performed by: INTERNAL MEDICINE

## 2022-05-06 PROCEDURE — 99213 OFFICE O/P EST LOW 20 MIN: CPT | Performed by: INTERNAL MEDICINE

## 2022-05-06 NOTE — PROGRESS NOTES
Reshma Renee  1945  [phone]    05/06/2022    [facility] CARDIOLOGY     Referring Provider: No ref. provider found     Jl Caal MD  [facility] [address] [place] [address]    Chief Complaint   Patient presents with   • Atrial Fibrillation       Problem List:   1. Paroxysmal Atrial Fibrillation   a. CHADSVasc = 6  b. Loop Recorder (MDT) 2017 implanted due to CVA  c. Transesophageal echocardiogram 10/17/2018 LVEF normal, no significant valvular insufficiency.  d. Failed sotalol, now on Tikosyn   2. Chest Pain Syndrome  a. Left heart catheterization June 2017 minimal plaque EF 60%  3. Hypertension  4. Dyslipidemia  5. Diabetes mellitus type 2  6. History of cerebellar CVA 2017  a. TIA in November 2018 while on Xarelto  7. History of tobacco abuse-resolved 1989  8. Hypothyroidism  9. History of migraines  10. Carotid stenosis  a. 2015 CT angiogram neck revealing 50 to 60% proximal right ICA stenosis  b. 6/2018 50 to 69% R ICA by ultrasound  11. History of colitis    Allergies  No Known Allergies    Current Medications    Current Outpatient Medications:   •  aspirin 81 MG EC tablet, Take 162 mg by mouth Daily., Disp: , Rfl:   •  atorvastatin (LIPITOR) 80 MG tablet, Take 80 mg by mouth Daily., Disp: , Rfl:   •  Calcium Carbonate-Vit D-Min (CALCIUM 1200 PO), Take 1 tablet by mouth Daily., Disp: , Rfl:   •  coenzyme Q10 100 MG capsule, Take 200 mg by mouth Daily., Disp: , Rfl:   •  dofetilide (TIKOSYN) 250 MCG capsule, Take 250 mcg by mouth 2 (Two) Times a Day., Disp: , Rfl:   •  levothyroxine (SYNTHROID, LEVOTHROID) 88 MCG tablet, Take 88 mcg by mouth Daily., Disp: , Rfl:   •  metoprolol succinate XL (TOPROL-XL) 50 MG 24 hr tablet, Take 50 mg by mouth Daily., Disp: , Rfl:   •  rivaroxaban (XARELTO) 20 MG tablet, Take 20 mg by mouth Daily., Disp: , Rfl:     History of Present Illness     Pt presents for follow up of PAF/HTN. Since we last saw the pt, pt has been dealing  "with significant amount of stress in the last 2 to 3 weeks as her  fell and developed a subdural hematoma.  He is still at the [facility].  She denies any substantial chest pain episodes.  She does note some mild dyspnea on exertion.  No long episodes of atrial fibrillation or tachypalpitations. . Denies any hospitalizations, ER visits, bleeding, or TIA/CVA symptoms. Overall feels okay.    ROS:  General:  + fatigue, No weight gain or loss  Cardiovascular:  Denies CP, PND, syncope, near syncope, edema or palpitations.  Pulmonary:  + mild HELMS, no cough, or wheezing      Vitals:    05/06/22 1047   BP: 150/92   BP Location: Left arm   Patient Position: Sitting   Pulse: 61   SpO2: 98%   Weight: 64 kg (141 lb)   Height: 162.6 cm (64\")     Body mass index is 24.2 kg/m².  PE:  General: NAD  Neck: no JVD, no carotid bruits, no TM  Heart RRR, NL S1, S2, S4 present, no rubs, murmurs  Lungs: CTA, no wheezes, rhonchi, or rales  Abd: soft, non-tender, NL BS  Ext: No musculoskeletal deformities, no edema, cyanosis, or clubbing  Psych: normal mood and affect    Diagnostic Data:    Interrogation of implantable loop recorder today demonstrates no AF  No substantial bradycardia.      ECG 12 Lead    Date/Time: 5/6/2022 10:59 AM  Performed by: Marc Ricardo MD  Authorized by: Marc Ricardo MD   Comparison: compared with previous ECG from 6/4/2021  Similar to previous ECG  Rhythm: sinus rhythm  BPM: 60              1. PAF (paroxysmal atrial fibrillation) (HCC)    2. Essential hypertension    3. TIA (transient ischemic attack)          Plan:    1.  Paroxysmal atrial fibrillation: No recurrence by interrogation of implantable loop recorder or clinically.   Continue Tikosyn.  QTC stable today.     2.  CVA: History of TIA and CVA most likely embolic in nature although the patient does have carotid artery stenosis being followed by her neurologist.  She does have an elevated Jamie Vasc score of " 6.  Continue both Xarelto and aspirin for now.      3.  Hypertension: Presently elevated today.  However her blood pressures at home have been stable.  Continue to monitor closely.  Dr. Stvee Granado    F/up in 8 months

## 2022-06-05 PROCEDURE — G2066 INTER DEVC REMOTE 30D: HCPCS | Performed by: INTERNAL MEDICINE

## 2022-06-05 PROCEDURE — 93298 REM INTERROG DEV EVAL SCRMS: CPT | Performed by: INTERNAL MEDICINE

## 2022-07-05 ENCOUNTER — TELEPHONE (OUTPATIENT)
Dept: CARDIOLOGY | Facility: CLINIC | Age: 77
End: 2022-07-05

## 2023-05-10 ENCOUNTER — OFFICE VISIT (OUTPATIENT)
Dept: CARDIOLOGY | Facility: CLINIC | Age: 78
End: 2023-05-10
Payer: MEDICARE

## 2023-05-10 VITALS
OXYGEN SATURATION: 98 % | SYSTOLIC BLOOD PRESSURE: 140 MMHG | DIASTOLIC BLOOD PRESSURE: 68 MMHG | HEART RATE: 56 BPM | BODY MASS INDEX: 24.65 KG/M2 | WEIGHT: 144.4 LBS | HEIGHT: 64 IN

## 2023-05-10 DIAGNOSIS — I48.0 PAROXYSMAL ATRIAL FIBRILLATION: Primary | ICD-10-CM

## 2023-05-10 DIAGNOSIS — I10 ESSENTIAL HYPERTENSION: ICD-10-CM

## 2023-05-10 PROCEDURE — 3077F SYST BP >= 140 MM HG: CPT | Performed by: PHYSICIAN ASSISTANT

## 2023-05-10 PROCEDURE — 93000 ELECTROCARDIOGRAM COMPLETE: CPT | Performed by: PHYSICIAN ASSISTANT

## 2023-05-10 PROCEDURE — 99214 OFFICE O/P EST MOD 30 MIN: CPT | Performed by: PHYSICIAN ASSISTANT

## 2023-05-10 PROCEDURE — 3078F DIAST BP <80 MM HG: CPT | Performed by: PHYSICIAN ASSISTANT

## 2023-05-10 RX ORDER — ASPIRIN 81 MG/1
TABLET ORAL EVERY 24 HOURS
COMMUNITY
End: 2023-05-10 | Stop reason: SDUPTHER

## 2023-05-10 RX ORDER — METOPROLOL TARTRATE 50 MG/1
TABLET, FILM COATED ORAL
COMMUNITY
End: 2023-05-10 | Stop reason: SDUPTHER

## 2023-05-10 RX ORDER — DOFETILIDE 0.25 MG/1
250 CAPSULE ORAL 2 TIMES DAILY
Qty: 180 CAPSULE | Refills: 3 | Status: SHIPPED | OUTPATIENT
Start: 2023-05-10

## 2023-05-10 RX ORDER — PYRIDOXINE HCL (VITAMIN B6) 100 MG
TABLET ORAL
COMMUNITY
End: 2023-05-10 | Stop reason: SDUPTHER

## 2023-05-10 RX ORDER — FAMOTIDINE 20 MG/1
20 TABLET, FILM COATED ORAL DAILY
COMMUNITY
Start: 2023-02-12

## 2023-05-10 RX ORDER — ASPIRIN 81 MG/1
81 TABLET ORAL DAILY
COMMUNITY

## 2023-05-10 NOTE — PROGRESS NOTES
Reshma Renee  1945  [phone]        [facility] CARDIOLOGY [facility]         Jl Caal MD  [address] [facility] [address] [place] [address]    Chief Complaint   Patient presents with   • Paroxysmal atrial fibrillation       Problem List:   1. Paroxysmal Atrial Fibrillation   a. CHADSVasc = 6  b. Loop Recorder (MDT) 2017 implanted due to CVA  c. Transesophageal echocardiogram 10/17/2018 LVEF normal, no significant valvular insufficiency.  d. Failed sotalol, now on Tikosyn   2. Chest Pain Syndrome  a. Left heart catheterization June 2017 minimal plaque EF 60%  3. Hypertension  4. Dyslipidemia  5. Diabetes mellitus type 2  6. History of cerebellar CVA 2017  a. TIA in November 2018 while on Xarelto  7. History of tobacco abuse-resolved 1989  8. Hypothyroidism  9. History of migraines  10. Carotid stenosis  a. 2015 CT angiogram neck revealing 50 to 60% proximal right ICA stenosis  b. 6/2018 50 to 69% R ICA by ultrasound  11. History of colitis    Allergies  No Known Allergies    Current Medications    Current Outpatient Medications:   •  aspirin 81 MG EC tablet, Take 1 tablet by mouth Daily., Disp: , Rfl:   •  atorvastatin (LIPITOR) 80 MG tablet, Take 1 tablet by mouth Daily., Disp: , Rfl:   •  Calcium Carbonate-Vit D-Min (CALCIUM 1200 PO), Take 1 tablet by mouth Daily., Disp: , Rfl:   •  Cholecalciferol 10 MCG (400 UNIT) capsule, Vitamin D3, Disp: , Rfl:   •  coenzyme Q10 100 MG capsule, Take 2 capsules by mouth Daily., Disp: , Rfl:   •  dofetilide (TIKOSYN) 250 MCG capsule, Take 1 capsule by mouth 2 (Two) Times a Day., Disp: , Rfl:   •  famotidine (PEPCID) 20 MG tablet, Take 1 tablet by mouth Daily. as directed, Disp: , Rfl:   •  levothyroxine (SYNTHROID, LEVOTHROID) 88 MCG tablet, Take 1 tablet by mouth Daily., Disp: , Rfl:   •  metoprolol succinate XL (TOPROL-XL) 50 MG 24 hr tablet, Take 1 tablet by mouth Daily., Disp: , Rfl:   •  rivaroxaban (XARELTO) 20 MG tablet, Take 1  "tablet by mouth Daily., Disp: , Rfl:     History of Present Illness     Pt presents for follow up of PAF/HTN. Since we last saw the pt,she has has some \"flutter\" a few times a month. Most of this happening while she is in [place]. It last a few minutes. Her  is still having health issues which is causing her stress.  She denies any chest pain, sob, dizziness or syncope. She is taking her Tikosyn, and Xarelto with out side effects.      ROS:  General:  + fatigue, No weight gain or loss  Cardiovascular:  Denies CP, PND, syncope, near syncope, edema or palpitations.  Pulmonary:  + mild HELMS, no cough, or wheezing      Vitals:    05/10/23 1143   BP: 140/68   BP Location: Right arm   Patient Position: Sitting   Cuff Size: Adult   Pulse: 56   SpO2: 98%   Weight: 65.5 kg (144 lb 6.4 oz)   Height: 162.6 cm (64\")     Body mass index is 24.79 kg/m².  PE:  General: NAD  Neck: no JVD, no carotid bruits, no TM  Heart RRR, NL S1, S2, S4 present, no rubs, murmurs  Lungs: CTA, no wheezes, rhonchi, or rales  Abd: soft, non-tender, NL BS  Ext: No musculoskeletal deformities, no edema, cyanosis, or clubbing  Psych: normal mood and affect          ECG 12 Lead    Date/Time: 5/10/2023 11:57 AM  Performed by: Gurdeep Tafoya PA  Authorized by: Gurdeep Tafoya PA   Comparison: compared with previous ECG from 5/6/2022  Similar to previous ECG  Rhythm: sinus rhythm  Rate: bradycardic  Conduction: conduction normal  ST Segments: ST segments normal  T Waves: T waves normal  QRS axis: normal  Other: no other findings    Clinical impression: normal ECG            1. Paroxysmal atrial fibrillation    2. Essential hypertension          Plan:    1.  Paroxysmal atrial fibrillation:.   Continue Tikosyn.  QTC stable today. Rare episodes of afib. Loop recorder batter EOL     2.  CVA: History of TIA and CVA most likely embolic in nature although the patient does have carotid artery stenosis being followed by her neurologist.  She does have " an elevated Jamie Vasc score of 6.  Continue both Xarelto and aspirin for now.      3.  Hypertension:She reports hr bp is controlled. Followed by Dr. Granado     F/jim in 8 months    Electronically signed by TODD Lundberg, 05/10/23, 11:58 AM EDT.

## 2024-06-07 ENCOUNTER — OFFICE VISIT (OUTPATIENT)
Dept: CARDIOLOGY | Facility: CLINIC | Age: 79
End: 2024-06-07
Payer: MEDICARE

## 2024-06-07 VITALS
SYSTOLIC BLOOD PRESSURE: 120 MMHG | OXYGEN SATURATION: 97 % | WEIGHT: 141 LBS | DIASTOLIC BLOOD PRESSURE: 66 MMHG | HEART RATE: 59 BPM | HEIGHT: 64 IN | BODY MASS INDEX: 24.07 KG/M2

## 2024-06-07 DIAGNOSIS — I10 ESSENTIAL HYPERTENSION: ICD-10-CM

## 2024-06-07 DIAGNOSIS — G45.9 TIA (TRANSIENT ISCHEMIC ATTACK): ICD-10-CM

## 2024-06-07 DIAGNOSIS — I48.0 PAROXYSMAL ATRIAL FIBRILLATION: ICD-10-CM

## 2024-06-07 DIAGNOSIS — R07.2 PRECORDIAL CHEST PAIN: Primary | ICD-10-CM

## 2024-06-07 RX ORDER — AMLODIPINE BESYLATE 5 MG/1
5 TABLET ORAL DAILY
COMMUNITY
Start: 2024-04-09

## 2024-06-07 RX ORDER — NITROGLYCERIN 0.4 MG/1
0.4 TABLET SUBLINGUAL
COMMUNITY
Start: 2024-04-24

## 2024-06-07 RX ORDER — LOSARTAN POTASSIUM 50 MG/1
50 TABLET ORAL 2 TIMES DAILY
COMMUNITY
Start: 2024-04-09

## 2024-06-07 RX ORDER — SERTRALINE HYDROCHLORIDE 25 MG/1
25 TABLET, FILM COATED ORAL AS NEEDED
COMMUNITY
Start: 2024-05-16

## 2024-06-07 NOTE — PROGRESS NOTES
Reshma Renee  1945  [phone]    06/07/2024    [facility] CARDIOLOGY     Referring Provider: No ref. provider found     Jl Caal MD  [address] [facility] [address] [place], [place] [id number]    Chief Complaint   Patient presents with    paroxysmal atrial fibrillation       Problem List:   Paroxysmal Atrial Fibrillation   CHADSVasc = 6  Loop Recorder (MDT) 2017 implanted due to CVA  Transesophageal echocardiogram 10/17/2018 LVEF normal, no significant valvular insufficiency.  Failed sotalol, now on Tikosyn   Chest Pain Syndrome  Left heart catheterization June 2017 minimal plaque EF 60%  Stress test on 417 4 LVEF 55%.  Large area of severe intensity nonreversible ischemia in the inferior apical and inferior basal area with a small area of reversible ischemia inferior lateral area.    Echocardiogram 4/12/2024 LVEF normal no significant valvular insufficiency.  Hypertension  Dyslipidemia  Diabetes mellitus type 2  History of cerebellar CVA 2017  TIA in November 2018 while on Xarelto  History of tobacco abuse-resolved 1989  Hypothyroidism  History of migraines  Carotid stenosis  2015 CT angiogram neck revealing 50 to 60% proximal right ICA stenosis  6/2018 50 to 69% R ICA by ultrasound  History of colitis    Allergies  No Known Allergies    Current Medications    Current Outpatient Medications:     amLODIPine (NORVASC) 5 MG tablet, Take 1 tablet by mouth Daily., Disp: , Rfl:     atorvastatin (LIPITOR) 80 MG tablet, Take 1 tablet by mouth Daily., Disp: , Rfl:     Calcium Carbonate-Vit D-Min (CALCIUM 1200 PO), Take 1 tablet by mouth Daily., Disp: , Rfl:     coenzyme Q10 100 MG capsule, Take 1 capsule by mouth Daily., Disp: , Rfl:     dofetilide (TIKOSYN) 250 MCG capsule, Take 1 capsule by mouth 2 (Two) Times a Day., Disp: 180 capsule, Rfl: 3    famotidine (PEPCID) 20 MG tablet, Take 1 tablet by mouth Daily. as directed, Disp: , Rfl:     levothyroxine sodium (TIROSINT) 75 MCG capsule, Take  "1 capsule by mouth Daily., Disp: , Rfl:     losartan (COZAAR) 50 MG tablet, Take 1 tablet by mouth 2 (Two) Times a Day., Disp: , Rfl:     metoprolol succinate XL (TOPROL-XL) 50 MG 24 hr tablet, Take 1 tablet by mouth 2 (Two) Times a Day., Disp: , Rfl:     nitroglycerin (NITROSTAT) 0.4 MG SL tablet, Place 1 tablet under the tongue Every 5 (Five) Minutes As Needed for Chest Pain. do not exceed a total of 3 doses in 15 minutes, Disp: , Rfl:     rivaroxaban (XARELTO) 20 MG tablet, Take 1 tablet by mouth Daily., Disp: 90 tablet, Rfl: 4    sertraline (ZOLOFT) 25 MG tablet, Take 1 tablet by mouth As Needed., Disp: , Rfl:     History of Present Illness     Pt presents for follow up of paroxysmal atrial fibrillation and hypertension.  Since we last saw the pt, pt complained of substernal chest pain when she was seen in [place] in April of this year.  During that time she was under a great deal of stress taking care of her very sick .  She underwent stress test that demonstrated small reversible ischemic event and a normal echocardiogram.  Also normal carotid studies.  Her metoprolol was doubled and she was started on Norvasc for better control of her blood pressure.  Since then her chest pain has resolved.  She denies any AF episodes, SOB,  LH, and dizziness.  Still is under significant amount of stress regarding taking care of her ailing .  Blood pressures have been dramatically improved.        Vitals:    06/07/24 1108   BP: 120/66   BP Location: Left arm   Patient Position: Sitting   Pulse: 59   SpO2: 97%   Weight: 64 kg (141 lb)   Height: 162.6 cm (64\")     Body mass index is 24.2 kg/m².  PE:  General: NAD  Neck: no JVD, no carotid bruits, no TM  Heart RRR, NL S1, S2, S4 present, no rubs, murmurs  Lungs: CTA, no wheezes, rhonchi, or rales  Abd: soft, non-tender, NL BS  Ext: No musculoskeletal deformities, no edema, cyanosis, or clubbing  Psych: normal mood and affect    Diagnostic Data:        ECG 12 " Lead    Date/Time: 6/7/2024 11:44 AM  Performed by: Marc Ricardo MD    Authorized by: Marc Ricardo MD  Comparison: compared with previous ECG from 5/10/2023  Similar to previous ECG  Rhythm: sinus bradycardia  BPM: 58                 1. Precordial chest pain    2. Paroxysmal atrial fibrillation    3. TIA (transient ischemic attack)    4. Essential hypertension          Plan:  1.  Chest pain is in the setting of severe anxiety, hypertension and stress at home.  Presently doing very well.  Resolution of chest pain at this time.  Continue to monitor.  Did explain to her that the neck step would be left heart catheterization with Dr. Granado if she continues to have additional problems.      2. Paroxysmal atrial fibrillation:.   Continue Tikosyn.  QTC stable today. No recurrent episodes at this time that she is aware of.     3.  CVA: History of TIA and CVA most likely embolic in nature although the patient does have carotid artery stenosis being followed by her neurologist.  She does have an elevated Jamie Vasc score of 6.  Continue both Xarelto and aspirin for now.      4.  Hypertension:She reports hr bp is controlled. Followed by Dr. Granado     Patient to see her cardiologist in [place] in 6 months for EKG.  Return to see us clinic in 12 months.

## 2025-01-28 NOTE — TELEPHONE ENCOUNTER
Called and spoke to Mrs Renee in regards to [employer] loop recorder has reach RRT.  Informed her that we no longer will monitor her loop recorder.  She verbalized understanding.    no

## (undated) DEVICE — A2000 MULTI-USE SYRINGE KIT, P/N 701277-003KIT CONTENTS: 100ML CONTRAST RESERVOIR AND TUBING WITH CONTRAST SPIKE AND CLAMP: Brand: A2000 MULTI-USE SYRINGE KIT

## (undated) DEVICE — GLIDESHEATH BASIC HYDROPHILIC COATED INTRODUCER SHEATH: Brand: GLIDESHEATH

## (undated) DEVICE — MODEL AT P54, P/N 700608-035KIT CONTENTS: HAND CONTROLLER, 3-WAY HIGH-PRESSURE STOPCOCK WITH ROTATING END AND PREMIUM HIGH-PRESSURE TUBING: Brand: ANGIOTOUCH® KIT

## (undated) DEVICE — Device

## (undated) DEVICE — MODEL BT2000 P/N 700287-012KIT CONTENTS: MANIFOLD WITH SALINE AND CONTRAST PORTS, SALINE TUBING WITH SPIKE AND HAND SYRINGE, TRANSDUCER: Brand: BT2000 AUTOMATED MANIFOLD KIT

## (undated) DEVICE — CATH DIAG EXPO .056 FL3.5 6F 100CM

## (undated) DEVICE — CATH DIAG EXPO M/ PK 6FR FL4/FR4 PIG 3PK

## (undated) DEVICE — DEV COMP RAD PRELUDESYNC 24CM

## (undated) DEVICE — PK CATH CARD 10